# Patient Record
Sex: MALE | Race: WHITE | NOT HISPANIC OR LATINO | Employment: FULL TIME | RURAL
[De-identification: names, ages, dates, MRNs, and addresses within clinical notes are randomized per-mention and may not be internally consistent; named-entity substitution may affect disease eponyms.]

---

## 2022-09-01 ENCOUNTER — CLINICAL SUPPORT (OUTPATIENT)
Dept: PRIMARY CARE CLINIC | Facility: CLINIC | Age: 22
End: 2022-09-01

## 2022-09-01 DIAGNOSIS — Z02.83 ENCOUNTER FOR DRUG SCREENING: ICD-10-CM

## 2022-09-01 DIAGNOSIS — Z02.89 ENCOUNTER FOR PHYSICAL EXAMINATION RELATED TO EMPLOYMENT: Primary | ICD-10-CM

## 2022-09-01 PROCEDURE — 99000 PR SPECIMEN HANDLING,DR OFF->LAB: ICD-10-PCS | Mod: ,,, | Performed by: NURSE PRACTITIONER

## 2022-09-01 PROCEDURE — 99499 UNLISTED E&M SERVICE: CPT | Mod: ,,, | Performed by: NURSE PRACTITIONER

## 2022-09-01 PROCEDURE — 99000 SPECIMEN HANDLING OFFICE-LAB: CPT | Mod: ,,, | Performed by: NURSE PRACTITIONER

## 2022-09-01 PROCEDURE — 99499 PR PHYSICAL - BASIC NON DOT/CDL: ICD-10-PCS | Mod: ,,, | Performed by: NURSE PRACTITIONER

## 2022-09-01 NOTE — PROGRESS NOTES
Subjective:       Patient ID: Vimal Ayers is a 22 y.o. male.    Chief Complaint: No chief complaint on file.    HPI  Review of Systems      Objective:      Physical Exam    Assessment:       Problem List Items Addressed This Visit    None  Visit Diagnoses       Encounter for physical examination related to employment    -  Primary    Encounter for drug screening                Plan:       See scanned documents in .